# Patient Record
Sex: MALE | URBAN - METROPOLITAN AREA
[De-identification: names, ages, dates, MRNs, and addresses within clinical notes are randomized per-mention and may not be internally consistent; named-entity substitution may affect disease eponyms.]

---

## 2021-09-22 ENCOUNTER — PREPPED CHART (OUTPATIENT)
Dept: URBAN - METROPOLITAN AREA CLINIC 101 | Facility: CLINIC | Age: 21
End: 2021-09-22

## 2021-09-22 PROBLEM — H25.042 POSTERIOR SUBCAPSULAR (PSC) POLAR SENILE CATARACT: Noted: 2021-09-22

## 2021-09-22 PROBLEM — H43.393 VITREOUS OPACITIES, OTHER: Noted: 2021-09-22

## 2021-09-22 PROBLEM — H18.553 MACULAR DYSTROPHY: Noted: 2021-09-22

## 2021-09-22 PROBLEM — H35.023 COATS' DISEASE: Noted: 2021-09-22

## 2021-09-22 PROBLEM — H31.013 CHORIORETINAL SCARS, MACULA: Noted: 2021-09-22

## 2022-06-23 ENCOUNTER — APPOINTMENT (RX ONLY)
Dept: URBAN - METROPOLITAN AREA CLINIC 42 | Facility: CLINIC | Age: 22
Setting detail: DERMATOLOGY
End: 2022-06-23

## 2022-06-23 DIAGNOSIS — L42 PITYRIASIS ROSEA: ICD-10-CM

## 2022-06-23 PROCEDURE — ? PRESCRIPTION

## 2022-06-23 PROCEDURE — 99213 OFFICE O/P EST LOW 20 MIN: CPT

## 2022-06-23 PROCEDURE — ? COUNSELING

## 2022-06-23 PROCEDURE — ? DIAGNOSIS COMMENT

## 2022-06-23 RX ORDER — TRIAMCINOLONE ACETONIDE 1 MG/G
OINTMENT TOPICAL
Qty: 454 | Refills: 1 | Status: ERX | COMMUNITY
Start: 2022-06-23

## 2022-06-23 RX ADMIN — TRIAMCINOLONE ACETONIDE: 1 OINTMENT TOPICAL at 00:00

## 2022-06-23 ASSESSMENT — BSA RASH: BSA RASH: 50

## 2022-06-23 NOTE — HPI: RASH
How Severe Is Your Rash?: severe
Is This A New Presentation, Or A Follow-Up?: Rash
Additional History: Pt developed single dark lesion on upper chest which spread rapidly around body over the following “3-4 days”. Pt given hydrocortisone 2.5% ointment and had punch biopsy performed on upper back which is scabbed over today. Pt’s family brought bx results but would like second opinion of pt’s rash. Rash does not bother pt.  Testing negative for syphilis

## 2022-06-23 NOTE — PROCEDURE: DIAGNOSIS COMMENT
Comment: Skin biopsy done from an outside dermatologist. RPR negative. Natural course of ID discussed with expected resolution in 12 weeks. Triamcinolone prn for itching
Render Risk Assessment In Note?: no
Detail Level: Simple

## 2022-07-28 ASSESSMENT — VISUAL ACUITY: OS_CC: CF 3FT

## 2022-07-28 ASSESSMENT — TONOMETRY
OD_IOP_MMHG: 12
OS_IOP_MMHG: 15

## 2022-09-22 ENCOUNTER — 1 YEAR COMPLETE EXAM (OUTPATIENT)
Dept: URBAN - METROPOLITAN AREA CLINIC 101 | Facility: CLINIC | Age: 22
End: 2022-09-22

## 2022-09-22 DIAGNOSIS — H31.013: ICD-10-CM

## 2022-09-22 DIAGNOSIS — H18.553: ICD-10-CM

## 2022-09-22 DIAGNOSIS — H35.023: ICD-10-CM

## 2022-09-22 DIAGNOSIS — H43.393: ICD-10-CM

## 2022-09-22 PROCEDURE — 92201 OPSCPY EXTND RTA DRAW UNI/BI: CPT

## 2022-09-22 PROCEDURE — 92014 COMPRE OPH EXAM EST PT 1/>: CPT

## 2022-09-22 ASSESSMENT — TONOMETRY
OS_IOP_MMHG: 16
OD_IOP_MMHG: 13

## 2022-09-22 ASSESSMENT — VISUAL ACUITY: OS_CC: CF 2FT

## 2023-09-21 ENCOUNTER — 1 YEAR COMPLETE EXAM (OUTPATIENT)
Dept: URBAN - METROPOLITAN AREA CLINIC 101 | Facility: CLINIC | Age: 23
End: 2023-09-21

## 2023-09-21 DIAGNOSIS — H43.393: ICD-10-CM

## 2023-09-21 DIAGNOSIS — H31.013: ICD-10-CM

## 2023-09-21 DIAGNOSIS — H35.023: ICD-10-CM

## 2023-09-21 DIAGNOSIS — H18.553: ICD-10-CM

## 2023-09-21 PROCEDURE — 92134 CPTRZ OPH DX IMG PST SGM RTA: CPT

## 2023-09-21 PROCEDURE — 92202 OPSCPY EXTND ON/MAC DRAW: CPT

## 2023-09-21 PROCEDURE — 92014 COMPRE OPH EXAM EST PT 1/>: CPT

## 2023-09-21 ASSESSMENT — TONOMETRY
OD_IOP_MMHG: 17
OS_IOP_MMHG: 14

## 2023-09-21 ASSESSMENT — VISUAL ACUITY: OS_CC: CF 1FT

## 2024-09-19 ENCOUNTER — 1 YEAR COMPLETE EXAM (OUTPATIENT)
Dept: URBAN - METROPOLITAN AREA CLINIC 101 | Facility: CLINIC | Age: 24
End: 2024-09-19

## 2024-09-19 DIAGNOSIS — H43.393: ICD-10-CM

## 2024-09-19 DIAGNOSIS — H31.013: ICD-10-CM

## 2024-09-19 DIAGNOSIS — H35.023: ICD-10-CM

## 2024-09-19 DIAGNOSIS — H18.553: ICD-10-CM

## 2024-09-19 PROCEDURE — 92202 OPSCPY EXTND ON/MAC DRAW: CPT

## 2024-09-19 PROCEDURE — 92134 CPTRZ OPH DX IMG PST SGM RTA: CPT

## 2024-09-19 PROCEDURE — 92014 COMPRE OPH EXAM EST PT 1/>: CPT

## 2024-09-19 ASSESSMENT — TONOMETRY
OD_IOP_MMHG: 12
OS_IOP_MMHG: 11